# Patient Record
Sex: FEMALE
[De-identification: names, ages, dates, MRNs, and addresses within clinical notes are randomized per-mention and may not be internally consistent; named-entity substitution may affect disease eponyms.]

---

## 2019-10-02 ENCOUNTER — HOSPITAL ENCOUNTER (OUTPATIENT)
Dept: HOSPITAL 56 - MW.ED | Age: 29
Setting detail: OBSERVATION
LOS: 2 days | Discharge: HOME | End: 2019-10-04
Attending: INTERNAL MEDICINE | Admitting: INTERNAL MEDICINE
Payer: COMMERCIAL

## 2019-10-02 DIAGNOSIS — N13.30: ICD-10-CM

## 2019-10-02 DIAGNOSIS — F17.210: ICD-10-CM

## 2019-10-02 DIAGNOSIS — N12: Primary | ICD-10-CM

## 2019-10-02 LAB
BUN SERPL-MCNC: 10 MG/DL (ref 7–18)
CHLORIDE SERPL-SCNC: 97 MMOL/L (ref 98–107)
CO2 SERPL-SCNC: 24.9 MMOL/L (ref 21–32)
GLUCOSE SERPL-MCNC: 103 MG/DL (ref 74–106)
LIPASE SERPL-CCNC: 68 U/L (ref 73–393)
POTASSIUM SERPL-SCNC: 3.4 MMOL/L (ref 3.5–5.1)
SODIUM SERPL-SCNC: 135 MMOL/L (ref 136–145)

## 2019-10-02 PROCEDURE — 36415 COLL VENOUS BLD VENIPUNCTURE: CPT

## 2019-10-02 PROCEDURE — 96375 TX/PRO/DX INJ NEW DRUG ADDON: CPT

## 2019-10-02 PROCEDURE — 80048 BASIC METABOLIC PNL TOTAL CA: CPT

## 2019-10-02 PROCEDURE — 81025 URINE PREGNANCY TEST: CPT

## 2019-10-02 PROCEDURE — 83605 ASSAY OF LACTIC ACID: CPT

## 2019-10-02 PROCEDURE — 83690 ASSAY OF LIPASE: CPT

## 2019-10-02 PROCEDURE — G0378 HOSPITAL OBSERVATION PER HR: HCPCS

## 2019-10-02 PROCEDURE — 81001 URINALYSIS AUTO W/SCOPE: CPT

## 2019-10-02 PROCEDURE — 87086 URINE CULTURE/COLONY COUNT: CPT

## 2019-10-02 PROCEDURE — 85025 COMPLETE CBC W/AUTO DIFF WBC: CPT

## 2019-10-02 PROCEDURE — 87040 BLOOD CULTURE FOR BACTERIA: CPT

## 2019-10-02 PROCEDURE — 96365 THER/PROPH/DIAG IV INF INIT: CPT

## 2019-10-02 PROCEDURE — 96361 HYDRATE IV INFUSION ADD-ON: CPT

## 2019-10-02 PROCEDURE — 99285 EMERGENCY DEPT VISIT HI MDM: CPT

## 2019-10-02 PROCEDURE — 74177 CT ABD & PELVIS W/CONTRAST: CPT

## 2019-10-02 PROCEDURE — 80053 COMPREHEN METABOLIC PANEL: CPT

## 2019-10-02 NOTE — CT
INDICATION:



Left flank pain radiating to the left lower quadrant. Nausea and vomiting.



TECHNIQUE:



CT abdomen and pelvis acquired with 75 cc Isovue 370 IV contrast.



COMPARISON:



None. 



FINDINGS:



Lower chest: Unremarkable. 



Liver: Unremarkable. Normal in size and attenuation. No masses. 



Gallbladder and bile ducts: Unremarkable. No stones or inflammation. No 

biliary dilatation. 



Pancreas: Unremarkable. No mass or inflammation. 



Spleen: Unremarkable. Normal in size. No masses. 



Adrenal glands: Unremarkable. No nodules. 



Kidneys: Mild left-sided hydroureteronephrosis. No current kidney or 

ureteral stone. No sign of pyelonephritis. 



GI tract: Unremarkable. Normal in caliber. No sign of mass or inflammation. 

Normal appendix.



Vasculature: Unremarkable. Mesenteric arteries are patent.



Lymph nodes: No lymphadenopathy. 



Omentum/Peritoneum/Abdominal Wall: Unremarkable. No sign of mass or 

infiltration. No free air or significant free fluid. 



Pelvis: Unremarkable. 



Bones: Unremarkable for age. 



IMPRESSION:



Mild left-sided hydroureteronephrosis. This finding can be seen with a 

recently passed stone or urinary tract infection. There are no current 

kidney or ureteral stones. Remainder of the exam is unremarkable.



Please note that all CT scans at this facility use dose modulation, 

iterative reconstruction, and/or weight-based dosing when appropriate to 

reduce radiation dose to as low as reasonably achievable.



Dictated by Castillo Joe MD @ Oct  2 2019  5:29PM



Signed by Dr. Castillo Joe @ Oct  2 2019  5:40PM

## 2019-10-02 NOTE — PCM.HP.2
H&P History of Present Illness





- General


Date of Service: 10/02/19


Admit Problem/Dx: 


 Admission Diagnosis/Problem





Admission Diagnosis/Problem      Acute pyelonephritis











- History of Present Illness


Initial Comments - Free Text/Narative: 





30 yo female who presents with one week of dysuria and urinary frequency.  Two 

days ago she was prescribed bactrim by [facility] for UTI.  She has 

since developed flank pain, fevers, and nausea.  She was seen in the ED and 

noted to have a leukocytosis of 18,000 and CT scan showing hydrouteronephrosis.


  ** Left Lower Abdomen


Pain Score (Numeric/FACES): 8





- Related Data


Allergies/Adverse Reactions: 


 Allergies











Allergy/AdvReac Type Severity Reaction Status Date / Time


 


No Known Allergies Allergy   Verified 10/02/19 20:33











Home Medications: 


 Home Meds





Loratadine [Allergy Relief] 10 mg PO DAILY PRN 10/02/19 [History]


Sulfamethoxazole/Trimethoprim [Bactrim Ds Tablet] 800 mg PO BID 10/02/19 [

History]











Past Medical History





- Past Health History


Medical/Surgical History: Denies Medical/Surgical History


HEENT History: Reports: None


Cardiovascular History: Reports: None


Respiratory History: Reports: None


Gastrointestinal History: Reports: None


Genitourinary History: Reports: None


OB/GYN History: Reports: Polycystic Ovaries


Musculoskeletal History: Reports: None


Neurological History: Reports: None


Psychiatric History: Reports: None


Endocrine/Metabolic History: Reports: None


Hematologic History: Reports: None


Immunologic History: Reports: None


Oncologic (Cancer) History: Reports: None


Dermatologic History: Reports: None





- Infectious Disease History


Infectious Disease History: Reports: Chicken Pox





- Past Surgical History


Head Surgeries/Procedures: Reports: None





Social & Family History





- Family History


Oncologic: Reports: Other (See Below)


Other Oncologic Family History: mouth cancer (father)





- Tobacco Use


Smoking Status *Q: Current Every Day Smoker


Years of Tobacco use: 14


Packs/Tins Daily: 1





- Caffeine Use


Caffeine Use: Reports: None





- Alcohol Use


Days Per Week of Alcohol Use: 7


Number of Drinks Per Day: 2


Total Drinks Per Week: 14





- Recreational Drug Use


Recreational Drug Use: No





H&P Review of Systems





- Review of Systems:


Review Of Systems: ROS reveals no pertinent complaints other than HPI.





Exam





- Exam


Exam: See Below





- Vital Signs


Vital Signs: 


 Last Vital Signs











Temp  36.7 C   10/02/19 16:03


 


Pulse  71   10/02/19 19:33


 


Resp  18   10/02/19 19:33


 


BP  104/56 L  10/02/19 19:33


 


Pulse Ox  98   10/02/19 16:03











Weight: 72.575 kg





- Exam


General: Alert, Oriented


HEENT: Mucosa Moist & Pink


Neck: Supple


Lungs: Clear to Auscultation, Normal Respiratory Effort


Cardiovascular: Regular Rate, Regular Rhythm


GI/Abdominal Exam: Normal Bowel Sounds, Soft, Non-Tender


Back Exam: No: CVA Tenderness (L), CVA Tenderness (R)


Skin: Warm, Dry, Intact





- Patient Data


Lab Results Last 24 hrs: 


 Laboratory Results - last 24 hr











  10/02/19 10/02/19 10/02/19 Range/Units





  16:00 16:00 16:19 


 


WBC    18.35 H  (4.0-11.0)  K/uL


 


RBC    4.61  (4.30-5.90)  M/uL


 


Hgb    14.3  (12.0-16.0)  g/dL


 


Hct    41.7  (36.0-46.0)  %


 


MCV    90.5  (80.0-98.0)  fL


 


MCH    31.0  (27.0-32.0)  pg


 


MCHC    34.3  (31.0-37.0)  g/dL


 


RDW Std Deviation    44.6  (28.0-62.0)  fl


 


RDW Coeff of Nyasia    13  (11.0-15.0)  %


 


Plt Count    226  (150-400)  K/uL


 


MPV    10.70  (7.40-12.00)  fL


 


Neut % (Auto)    83.6 H  (48.0-80.0)  %


 


Lymph % (Auto)    7.7 L  (16.0-40.0)  %


 


Mono % (Auto)    8.1  (0.0-15.0)  %


 


Eos % (Auto)    0.4  (0.0-7.0)  %


 


Baso % (Auto)    0.2  (0.0-1.5)  %


 


Neut # (Auto)    15.3 H  (1.4-5.7)  K/uL


 


Lymph # (Auto)    1.4  (0.6-2.4)  K/uL


 


Mono # (Auto)    1.5 H  (0.0-0.8)  K/uL


 


Eos # (Auto)    0.1  (0.0-0.7)  K/uL


 


Baso # (Auto)    0.0  (0.0-0.1)  K/uL


 


Nucleated RBC %    0.0  /100WBC


 


Nucleated RBCs #    0  K/uL


 


Lactate     (0.20-2.00)  mmol/L


 


Sodium     (136-145)  mmol/L


 


Potassium     (3.5-5.1)  mmol/L


 


Chloride     ()  mmol/L


 


Carbon Dioxide     (21.0-32.0)  mmol/L


 


BUN     (7.0-18.0)  mg/dL


 


Creatinine     (0.6-1.0)  mg/dL


 


Est Cr Clr Drug Dosing     mL/min


 


Estimated GFR (MDRD)     ml/min


 


Glucose     ()  mg/dL


 


Calcium     (8.5-10.1)  mg/dL


 


Total Bilirubin     (0.2-1.0)  mg/dL


 


AST     (15-37)  IU/L


 


ALT     (14-63)  IU/L


 


Alkaline Phosphatase     ()  U/L


 


Total Protein     (6.4-8.2)  g/dL


 


Albumin     (3.4-5.0)  g/dL


 


Globulin     (2.6-4.0)  g/dL


 


Albumin/Globulin Ratio     (0.9-1.6)  


 


Lipase     ()  U/L


 


Urine Color  YELLOW    


 


Urine Appearance  HAZY    


 


Urine pH  5.5    (5.0-8.0)  


 


Ur Specific Gravity  1.015    (1.001-1.035)  


 


Urine Protein  TRACE H    (NEGATIVE)  mg/dL


 


Urine Glucose (UA)  NEGATIVE    (NEGATIVE)  mg/dL


 


Urine Ketones  40 H    (NEGATIVE)  mg/dL


 


Urine Occult Blood  MODERATE H    (NEGATIVE)  


 


Urine Nitrite  NEGATIVE    (NEGATIVE)  


 


Urine Bilirubin  SMALL H    (NEGATIVE)  


 


Urine Urobilinogen  0.2    (<2.0)  EU/dL


 


Ur Leukocyte Esterase  SMALL H    (NEGATIVE)  


 


Urine RBC  0-3    (0-2/HPF)  


 


Urine WBC  4-6    (0-5/HPF)  


 


Ur Epithelial Cells  FEW    (NONE-FEW)  


 


Urine Bacteria  RARE    (NEGATIVE)  


 


Urine Mucus  LIGHT    (NONE-MOD)  


 


Urine HCG, Qual   NEGATIVE   (NEGATIVE)  














  10/02/19 10/02/19 Range/Units





  16:19 17:41 


 


WBC    (4.0-11.0)  K/uL


 


RBC    (4.30-5.90)  M/uL


 


Hgb    (12.0-16.0)  g/dL


 


Hct    (36.0-46.0)  %


 


MCV    (80.0-98.0)  fL


 


MCH    (27.0-32.0)  pg


 


MCHC    (31.0-37.0)  g/dL


 


RDW Std Deviation    (28.0-62.0)  fl


 


RDW Coeff of Nyasia    (11.0-15.0)  %


 


Plt Count    (150-400)  K/uL


 


MPV    (7.40-12.00)  fL


 


Neut % (Auto)    (48.0-80.0)  %


 


Lymph % (Auto)    (16.0-40.0)  %


 


Mono % (Auto)    (0.0-15.0)  %


 


Eos % (Auto)    (0.0-7.0)  %


 


Baso % (Auto)    (0.0-1.5)  %


 


Neut # (Auto)    (1.4-5.7)  K/uL


 


Lymph # (Auto)    (0.6-2.4)  K/uL


 


Mono # (Auto)    (0.0-0.8)  K/uL


 


Eos # (Auto)    (0.0-0.7)  K/uL


 


Baso # (Auto)    (0.0-0.1)  K/uL


 


Nucleated RBC %    /100WBC


 


Nucleated RBCs #    K/uL


 


Lactate   0.9  (0.20-2.00)  mmol/L


 


Sodium  135 L   (136-145)  mmol/L


 


Potassium  3.4 L   (3.5-5.1)  mmol/L


 


Chloride  97 L   ()  mmol/L


 


Carbon Dioxide  24.9   (21.0-32.0)  mmol/L


 


BUN  10   (7.0-18.0)  mg/dL


 


Creatinine  1.3 H   (0.6-1.0)  mg/dL


 


Est Cr Clr Drug Dosing  66.73   mL/min


 


Estimated GFR (MDRD)  48.4   ml/min


 


Glucose  103   ()  mg/dL


 


Calcium  9.0   (8.5-10.1)  mg/dL


 


Total Bilirubin  1.4 H   (0.2-1.0)  mg/dL


 


AST  19   (15-37)  IU/L


 


ALT  20   (14-63)  IU/L


 


Alkaline Phosphatase  64   ()  U/L


 


Total Protein  7.6   (6.4-8.2)  g/dL


 


Albumin  3.5   (3.4-5.0)  g/dL


 


Globulin  4.1 H   (2.6-4.0)  g/dL


 


Albumin/Globulin Ratio  0.9   (0.9-1.6)  


 


Lipase  68 L   ()  U/L


 


Urine Color    


 


Urine Appearance    


 


Urine pH    (5.0-8.0)  


 


Ur Specific Gravity    (1.001-1.035)  


 


Urine Protein    (NEGATIVE)  mg/dL


 


Urine Glucose (UA)    (NEGATIVE)  mg/dL


 


Urine Ketones    (NEGATIVE)  mg/dL


 


Urine Occult Blood    (NEGATIVE)  


 


Urine Nitrite    (NEGATIVE)  


 


Urine Bilirubin    (NEGATIVE)  


 


Urine Urobilinogen    (<2.0)  EU/dL


 


Ur Leukocyte Esterase    (NEGATIVE)  


 


Urine RBC    (0-2/HPF)  


 


Urine WBC    (0-5/HPF)  


 


Ur Epithelial Cells    (NONE-FEW)  


 


Urine Bacteria    (NEGATIVE)  


 


Urine Mucus    (NONE-MOD)  


 


Urine HCG, Qual    (NEGATIVE)  











Result Diagrams: 


 10/03/19 04:45





 10/03/19 04:45


Problem List Initiated/Reviewed/Updated: Yes


Orders Last 24hrs: 


 Active Orders 24 hr











 Category Date Time Status


 


 Admission Status [Patient Status] [ADT] Stat ADT  10/02/19 18:18 Active


 


 Antiembolic Devices [RC] PER UNIT ROUTINE Care  10/02/19 21:14 Ordered


 


 Oxygen Therapy [RC] PRN Care  10/02/19 21:13 Ordered


 


 Up ad Bhavna [RC] ASDIRECTED Care  10/02/19 21:13 Ordered


 


 VTE/DVT Education [RC] PER UNIT ROUTINE Care  10/02/19 21:13 Ordered


 


 Vital Signs [RC] Q4H Care  10/02/19 21:13 Ordered


 


 Regular Diet [DIET] Diet  10/02/19 Breakfast Ordered


 


 BASIC METABOLIC PANEL,BMP [CHEM] AM Lab  10/03/19 05:11 Ordered


 


 CBC WITH AUTO DIFF [HEME] AM Lab  10/03/19 05:11 Ordered


 


 CULTURE BLOOD [BC] Stat Lab  10/02/19 17:41 Received


 


 CULTURE BLOOD [BC] Stat Lab  10/02/19 17:51 Received


 


 CULTURE URINE [RM] Stat Lab  10/02/19 16:00 Received


 


 Acetaminophen [Tylenol] Med  10/02/19 21:13 Ordered





 650 mg PO Q4H PRN   


 


 Ibuprofen [Motrin] Med  10/02/19 21:13 Ordered





 200 mg PO Q6H PRN   


 


 Ondansetron [Zofran] Med  10/02/19 21:13 Ordered





 4 mg IVPUSH Q4H PRN   


 


 Sodium Chloride 0.9% @ 125 MLS/HR (1000ml) Med  10/02/19 21:15 Ordered





 Sodium Chloride 0.9% [Normal Saline] 1,000 ml   





 IV ASDIRECTED   


 


 cefTRIAXone [Rocephin] 1 gm Med  10/03/19 17:00 Ordered





 Sodium Chloride 0.9% [Normal Saline] 50 ml   





 IV Q24H   


 


 Blood Culture x2 Reflex Set [OM.PC] Stat Oth  10/02/19 16:57 Ordered


 


 Sequential Compression Device [OM.PC] Per Unit Routine Oth  10/02/19 21:13 

Ordered


 


 Resuscitation Status Routine Resus Stat  10/02/19 21:13 Ordered








 Medication Orders





Ceftriaxone Sodium 1 gm/ (Sodium Chloride)  50 mls @ 100 mls/hr IV Q24H KATELYNN








Assessment/Plan Comment:: 





30 yo female admitted for pyelonephritis that has failed outpatient management.

  We will treat with Rocephin and await cultures.

## 2019-10-02 NOTE — EDM.PDOC
ED HPI GENERAL MEDICAL PROBLEM





- General


Chief Complaint: Genitourinary Problem


Stated Complaint: KIDNEY STONES


Time Seen by Provider: 10/02/19 16:02


Source of Information: Reports: Patient


History Limitations: Reports: No Limitations





- History of Present Illness


INITIAL COMMENTS - FREE TEXT/NARRATIVE: 


HISTORY AND PHYSICAL:





History of present illness:


Patient is a 29-year-old female presents to the ED today with concern of left 

flank pain 3 days. Patient states she main appointment yesterday with [facility] and was given Bactrim for a urinary infection. Patient 

states since then the left flank pain has worsened and she is concerned she may 

have a kidney stone. Patient denies any health history or any other symptoms or 

concerns at this time.





Patient denies fever, chills, chest pain, shortness of breath, or cough. Denies 

headache, neck stiff ness, change in vision, syncope, or near syncope. Denies 

nausea, vomiting, diarrhea, constipation, or dysuria. Has not noted any blood 

in urine or stool. Patient has been eating and drinking appropriately.





Review of systems: 


As per history of present illness and below otherwise all systems reviewed and 

negative.





Past medical history: 


As per history of present illness and as reviewed below otherwise 

noncontributory.





Surgical history: 


As per history of present illness and as reviewed below otherwise 

noncontributory.





Social history: 


See social history for further information





Family history: 


As per history of present illness and as reviewed below otherwise 

noncontributory.





Physical exam:


General: Patient is alert, oriented, and in no acute distress. Patient sitting 

comfortably on exam table.


HEENT: Atraumatic, normocephalic, pupils equal and reactive bilaterally, 

negative for conjunctival pallor or scleral icterus, mucous membranes moist, 

TMs normal bilaterally, throat clear, neck supple, nontender, trachea midline. 

No drooling or trismus noted. No meningeal signs. No hot potato voice noted. 


Lungs: Clear to auscultation, breath sounds equal bilaterally, chest nontender.


Heart: S1S2, regular rate and rhythm without overt murmur


Abdomen: Soft, nondistended, nontender. Negative for masses or 

hepatosplenomegaly. Positive for costovertebral tenderness of the left.


Pelvis: Stable nontender.


Genitourinary: Deferred.


Rectal: Deferred.


Skin: Intact, warm, dry. No lesions or rashes noted.


Extremities: Atraumatic, negative for cords or calf pain. Neurovascular 

unremarkable.


Neuro: Awake, alert, oriented. Cranial nerves II through XII unremarkable. 

Cerebellum unremarkable. Motor and sensory unremarkable throughout. Exam 

nonfocal.





Notes:


Dr. Banda was consulted on patient and will admit to observation.


Voices understanding and is agreeable to plan of care. Denies any further 

questions or concerns at this time.





Diagnostics:


CBC, CMP, lipase, UA, urine hCG, abdominal pelvic CT, lactate, blood cultures 2





Therapeutics:


Rocephin, saline, Toradol, Zofran





Impression: 


Acute pyelonephritis





Plan:


1. Admit to observation to Dr. Banda.





Definitive disposition and diagnosis as appropriate pending reevaluation and 

review of above.





  ** Left Lower Abdomen


Pain Score (Numeric/FACES): 8





- Related Data


 Allergies











Allergy/AdvReac Type Severity Reaction Status Date / Time


 


No Known Allergies Allergy   Verified 10/02/19 16:04











Home Meds: 


 Home Meds





Sulfamethoxazole/Trimethoprim [Bactrim Ds Tablet] 800 mg PO BID 10/02/19 [

History]











Past Medical History





- Past Health History


Medical/Surgical History: Denies Medical/Surgical History


HEENT History: Reports: None


Cardiovascular History: Reports: None


Respiratory History: Reports: None


Gastrointestinal History: Reports: None


Genitourinary History: Reports: None


OB/GYN History: Reports: Polycystic Ovaries


Musculoskeletal History: Reports: None


Neurological History: Reports: None


Psychiatric History: Reports: None


Endocrine/Metabolic History: Reports: None


Hematologic History: Reports: None


Immunologic History: Reports: None


Oncologic (Cancer) History: Reports: None


Dermatologic History: Reports: None





- Infectious Disease History


Infectious Disease History: Reports: None





- Past Surgical History


Head Surgeries/Procedures: Reports: None





Social & Family History





- Tobacco Use


Smoking Status *Q: Current Every Day Smoker


Years of Tobacco use: 14


Packs/Tins Daily: 1





- Caffeine Use


Caffeine Use: Reports: None





- Alcohol Use


Days Per Week of Alcohol Use: 7


Number of Drinks Per Day: 2


Total Drinks Per Week: 14





- Recreational Drug Use


Recreational Drug Use: No





ED ROS GENERAL





- Review of Systems


Review Of Systems: ROS reveals no pertinent complaints other than HPI.





ED EXAM, GENERAL





- Physical Exam


Exam: See Below (see dictation)





Course





- Vital Signs


Last Recorded V/S: 


 Last Vital Signs











Temp  98.1 F   10/02/19 16:03


 


Pulse  114 H  10/02/19 16:03


 


Resp  18   10/02/19 16:03


 


BP  131/69   10/02/19 16:03


 


Pulse Ox  98   10/02/19 16:03














- Orders/Labs/Meds


Orders: 


 Active Orders 24 hr











 Category Date Time Status


 


 Admission Status [Patient Status] [ADT] Stat ADT  10/02/19 18:18 Ordered


 


 CULTURE BLOOD [BC] Stat Lab  10/02/19 17:41 Received


 


 CULTURE BLOOD [BC] Stat Lab  10/02/19 17:51 Received


 


 CULTURE URINE [RM] Stat Lab  10/02/19 16:00 Received


 


 Blood Culture x2 Reflex Set [OM.PC] Stat Oth  10/02/19 16:57 Ordered











Labs: 


 Laboratory Tests











  10/02/19 10/02/19 10/02/19 Range/Units





  16:00 16:00 16:19 


 


WBC    18.35 H  (4.0-11.0)  K/uL


 


RBC    4.61  (4.30-5.90)  M/uL


 


Hgb    14.3  (12.0-16.0)  g/dL


 


Hct    41.7  (36.0-46.0)  %


 


MCV    90.5  (80.0-98.0)  fL


 


MCH    31.0  (27.0-32.0)  pg


 


MCHC    34.3  (31.0-37.0)  g/dL


 


RDW Std Deviation    44.6  (28.0-62.0)  fl


 


RDW Coeff of Nyasia    13  (11.0-15.0)  %


 


Plt Count    226  (150-400)  K/uL


 


MPV    10.70  (7.40-12.00)  fL


 


Neut % (Auto)    83.6 H  (48.0-80.0)  %


 


Lymph % (Auto)    7.7 L  (16.0-40.0)  %


 


Mono % (Auto)    8.1  (0.0-15.0)  %


 


Eos % (Auto)    0.4  (0.0-7.0)  %


 


Baso % (Auto)    0.2  (0.0-1.5)  %


 


Neut # (Auto)    15.3 H  (1.4-5.7)  K/uL


 


Lymph # (Auto)    1.4  (0.6-2.4)  K/uL


 


Mono # (Auto)    1.5 H  (0.0-0.8)  K/uL


 


Eos # (Auto)    0.1  (0.0-0.7)  K/uL


 


Baso # (Auto)    0.0  (0.0-0.1)  K/uL


 


Nucleated RBC %    0.0  /100WBC


 


Nucleated RBCs #    0  K/uL


 


Lactate     (0.20-2.00)  mmol/L


 


Sodium     (136-145)  mmol/L


 


Potassium     (3.5-5.1)  mmol/L


 


Chloride     ()  mmol/L


 


Carbon Dioxide     (21.0-32.0)  mmol/L


 


BUN     (7.0-18.0)  mg/dL


 


Creatinine     (0.6-1.0)  mg/dL


 


Est Cr Clr Drug Dosing     mL/min


 


Estimated GFR (MDRD)     ml/min


 


Glucose     ()  mg/dL


 


Calcium     (8.5-10.1)  mg/dL


 


Total Bilirubin     (0.2-1.0)  mg/dL


 


AST     (15-37)  IU/L


 


ALT     (14-63)  IU/L


 


Alkaline Phosphatase     ()  U/L


 


Total Protein     (6.4-8.2)  g/dL


 


Albumin     (3.4-5.0)  g/dL


 


Globulin     (2.6-4.0)  g/dL


 


Albumin/Globulin Ratio     (0.9-1.6)  


 


Lipase     ()  U/L


 


Urine Color  YELLOW    


 


Urine Appearance  HAZY    


 


Urine pH  5.5    (5.0-8.0)  


 


Ur Specific Gravity  1.015    (1.001-1.035)  


 


Urine Protein  TRACE H    (NEGATIVE)  mg/dL


 


Urine Glucose (UA)  NEGATIVE    (NEGATIVE)  mg/dL


 


Urine Ketones  40 H    (NEGATIVE)  mg/dL


 


Urine Occult Blood  MODERATE H    (NEGATIVE)  


 


Urine Nitrite  NEGATIVE    (NEGATIVE)  


 


Urine Bilirubin  SMALL H    (NEGATIVE)  


 


Urine Urobilinogen  0.2    (<2.0)  EU/dL


 


Ur Leukocyte Esterase  SMALL H    (NEGATIVE)  


 


Urine RBC  0-3    (0-2/HPF)  


 


Urine WBC  4-6    (0-5/HPF)  


 


Ur Epithelial Cells  FEW    (NONE-FEW)  


 


Urine Bacteria  RARE    (NEGATIVE)  


 


Urine Mucus  LIGHT    (NONE-MOD)  


 


Urine HCG, Qual   NEGATIVE   (NEGATIVE)  














  10/02/19 10/02/19 Range/Units





  16:19 17:41 


 


WBC    (4.0-11.0)  K/uL


 


RBC    (4.30-5.90)  M/uL


 


Hgb    (12.0-16.0)  g/dL


 


Hct    (36.0-46.0)  %


 


MCV    (80.0-98.0)  fL


 


MCH    (27.0-32.0)  pg


 


MCHC    (31.0-37.0)  g/dL


 


RDW Std Deviation    (28.0-62.0)  fl


 


RDW Coeff of Nyasia    (11.0-15.0)  %


 


Plt Count    (150-400)  K/uL


 


MPV    (7.40-12.00)  fL


 


Neut % (Auto)    (48.0-80.0)  %


 


Lymph % (Auto)    (16.0-40.0)  %


 


Mono % (Auto)    (0.0-15.0)  %


 


Eos % (Auto)    (0.0-7.0)  %


 


Baso % (Auto)    (0.0-1.5)  %


 


Neut # (Auto)    (1.4-5.7)  K/uL


 


Lymph # (Auto)    (0.6-2.4)  K/uL


 


Mono # (Auto)    (0.0-0.8)  K/uL


 


Eos # (Auto)    (0.0-0.7)  K/uL


 


Baso # (Auto)    (0.0-0.1)  K/uL


 


Nucleated RBC %    /100WBC


 


Nucleated RBCs #    K/uL


 


Lactate   0.9  (0.20-2.00)  mmol/L


 


Sodium  135 L   (136-145)  mmol/L


 


Potassium  3.4 L   (3.5-5.1)  mmol/L


 


Chloride  97 L   ()  mmol/L


 


Carbon Dioxide  24.9   (21.0-32.0)  mmol/L


 


BUN  10   (7.0-18.0)  mg/dL


 


Creatinine  1.3 H   (0.6-1.0)  mg/dL


 


Est Cr Clr Drug Dosing  66.73   mL/min


 


Estimated GFR (MDRD)  48.4   ml/min


 


Glucose  103   ()  mg/dL


 


Calcium  9.0   (8.5-10.1)  mg/dL


 


Total Bilirubin  1.4 H   (0.2-1.0)  mg/dL


 


AST  19   (15-37)  IU/L


 


ALT  20   (14-63)  IU/L


 


Alkaline Phosphatase  64   ()  U/L


 


Total Protein  7.6   (6.4-8.2)  g/dL


 


Albumin  3.5   (3.4-5.0)  g/dL


 


Globulin  4.1 H   (2.6-4.0)  g/dL


 


Albumin/Globulin Ratio  0.9   (0.9-1.6)  


 


Lipase  68 L   ()  U/L


 


Urine Color    


 


Urine Appearance    


 


Urine pH    (5.0-8.0)  


 


Ur Specific Gravity    (1.001-1.035)  


 


Urine Protein    (NEGATIVE)  mg/dL


 


Urine Glucose (UA)    (NEGATIVE)  mg/dL


 


Urine Ketones    (NEGATIVE)  mg/dL


 


Urine Occult Blood    (NEGATIVE)  


 


Urine Nitrite    (NEGATIVE)  


 


Urine Bilirubin    (NEGATIVE)  


 


Urine Urobilinogen    (<2.0)  EU/dL


 


Ur Leukocyte Esterase    (NEGATIVE)  


 


Urine RBC    (0-2/HPF)  


 


Urine WBC    (0-5/HPF)  


 


Ur Epithelial Cells    (NONE-FEW)  


 


Urine Bacteria    (NEGATIVE)  


 


Urine Mucus    (NONE-MOD)  


 


Urine HCG, Qual    (NEGATIVE)  











Meds: 


Medications














Discontinued Medications














Generic Name Dose Route Start Last Admin





  Trade Name Chastity  PRN Reason Stop Dose Admin


 


Sodium Chloride  1,000 mls @ 999 mls/hr  10/02/19 16:03  10/02/19 16:28





  Normal Saline  IV  10/02/19 17:03  999 mls/hr





  BOLUS ONE   Administration





     





     





     





     


 


Ceftriaxone Sodium/Dextrose 1  50 mls @ 100 mls/hr  10/02/19 16:56  10/02/19 17:

56





  gm/ Premix  IV  10/02/19 17:25  100 mls/hr





  ONETIME ONE   Administration





     





     





     





     


 


Iopamidol  75 ml  10/02/19 17:23  10/02/19 17:23





  Isovue Multipack-370 (76%)  IVPUSH  10/02/19 17:24  75 ml





  ONETIME STA   Administration





     





     





     





     


 


Ketorolac Tromethamine  30 mg  10/02/19 16:03  10/02/19 16:28





  Toradol  IVPUSH  10/02/19 16:04  30 mg





  ONETIME ONE   Administration





     





     





     





     


 


Ondansetron HCl  4 mg  10/02/19 16:03  10/02/19 16:28





  Zofran  IVPUSH  10/02/19 16:04  4 mg





  ONETIME ONE   Administration





     





     





     





     














Departure





- Departure


Time of Disposition: 18:16


Disposition: Refer to Observation


Clinical Impression: 


 Acute pyelonephritis








- Discharge Information


Referrals: 


PCP,None [Primary Care Provider] - 


Forms:  ED Department Discharge





- My Orders


Last 24 Hours: 


My Active Orders





10/02/19 16:00


CULTURE URINE [RM] Stat 





10/02/19 16:57


Blood Culture x2 Reflex Set [OM.PC] Stat 





10/02/19 17:41


CULTURE BLOOD [BC] Stat 





10/02/19 17:51


CULTURE BLOOD [BC] Stat 





10/02/19 18:18


Admission Status [Patient Status] [ADT] Stat 














- Assessment/Plan


Last 24 Hours: 


My Active Orders





10/02/19 16:00


CULTURE URINE [RM] Stat 





10/02/19 16:57


Blood Culture x2 Reflex Set [OM.PC] Stat 





10/02/19 17:41


CULTURE BLOOD [BC] Stat 





10/02/19 17:51


CULTURE BLOOD [BC] Stat 





10/02/19 18:18


Admission Status [Patient Status] [ADT] Stat

## 2019-10-03 LAB
BUN SERPL-MCNC: 10 MG/DL (ref 7–18)
CHLORIDE SERPL-SCNC: 103 MMOL/L (ref 98–107)
CO2 SERPL-SCNC: 25 MMOL/L (ref 21–32)
GLUCOSE SERPL-MCNC: 91 MG/DL (ref 74–106)
POTASSIUM SERPL-SCNC: 3.4 MMOL/L (ref 3.5–5.1)
SODIUM SERPL-SCNC: 138 MMOL/L (ref 136–145)

## 2019-10-03 NOTE — PCM.PN
- General Info


Date of Service: 10/03/19





- Review of Systems


Systems Review Comment:: 


pain is improving, had fever this morning, reports some nausea.





- Patient Data


Vitals - Most Recent: 


 Last Vital Signs











Temp  36.6 C   10/03/19 08:00


 


Pulse  72   10/03/19 08:00


 


Resp  16   10/03/19 08:00


 


BP  112/66   10/03/19 08:00


 


Pulse Ox  97   10/03/19 08:00











Weight - Most Recent: 72.575 kg


I&O - Last 24 Hours: 


 Intake & Output











 10/02/19 10/03/19 10/03/19





 22:59 06:59 14:59


 


Intake Total  1574 


 


Output Total  700 


 


Balance  874 











Lab Results Last 24 Hours: 


 Laboratory Results - last 24 hr











  10/02/19 10/02/19 10/02/19 Range/Units





  16:00 16:00 16:19 


 


WBC    18.35 H  (4.0-11.0)  K/uL


 


RBC    4.61  (4.30-5.90)  M/uL


 


Hgb    14.3  (12.0-16.0)  g/dL


 


Hct    41.7  (36.0-46.0)  %


 


MCV    90.5  (80.0-98.0)  fL


 


MCH    31.0  (27.0-32.0)  pg


 


MCHC    34.3  (31.0-37.0)  g/dL


 


RDW Std Deviation    44.6  (28.0-62.0)  fl


 


RDW Coeff of Nyasia    13  (11.0-15.0)  %


 


Plt Count    226  (150-400)  K/uL


 


MPV    10.70  (7.40-12.00)  fL


 


Neut % (Auto)    83.6 H  (48.0-80.0)  %


 


Lymph % (Auto)    7.7 L  (16.0-40.0)  %


 


Mono % (Auto)    8.1  (0.0-15.0)  %


 


Eos % (Auto)    0.4  (0.0-7.0)  %


 


Baso % (Auto)    0.2  (0.0-1.5)  %


 


Neut # (Auto)    15.3 H  (1.4-5.7)  K/uL


 


Lymph # (Auto)    1.4  (0.6-2.4)  K/uL


 


Mono # (Auto)    1.5 H  (0.0-0.8)  K/uL


 


Eos # (Auto)    0.1  (0.0-0.7)  K/uL


 


Baso # (Auto)    0.0  (0.0-0.1)  K/uL


 


Nucleated RBC %    0.0  /100WBC


 


Nucleated RBCs #    0  K/uL


 


Lactate     (0.20-2.00)  mmol/L


 


Sodium     (136-145)  mmol/L


 


Potassium     (3.5-5.1)  mmol/L


 


Chloride     ()  mmol/L


 


Carbon Dioxide     (21.0-32.0)  mmol/L


 


BUN     (7.0-18.0)  mg/dL


 


Creatinine     (0.6-1.0)  mg/dL


 


Est Cr Clr Drug Dosing     mL/min


 


Estimated GFR (MDRD)     ml/min


 


Glucose     ()  mg/dL


 


Calcium     (8.5-10.1)  mg/dL


 


Total Bilirubin     (0.2-1.0)  mg/dL


 


AST     (15-37)  IU/L


 


ALT     (14-63)  IU/L


 


Alkaline Phosphatase     ()  U/L


 


Total Protein     (6.4-8.2)  g/dL


 


Albumin     (3.4-5.0)  g/dL


 


Globulin     (2.6-4.0)  g/dL


 


Albumin/Globulin Ratio     (0.9-1.6)  


 


Lipase     ()  U/L


 


Urine Color  YELLOW    


 


Urine Appearance  HAZY    


 


Urine pH  5.5    (5.0-8.0)  


 


Ur Specific Gravity  1.015    (1.001-1.035)  


 


Urine Protein  TRACE H    (NEGATIVE)  mg/dL


 


Urine Glucose (UA)  NEGATIVE    (NEGATIVE)  mg/dL


 


Urine Ketones  40 H    (NEGATIVE)  mg/dL


 


Urine Occult Blood  MODERATE H    (NEGATIVE)  


 


Urine Nitrite  NEGATIVE    (NEGATIVE)  


 


Urine Bilirubin  SMALL H    (NEGATIVE)  


 


Urine Urobilinogen  0.2    (<2.0)  EU/dL


 


Ur Leukocyte Esterase  SMALL H    (NEGATIVE)  


 


Urine RBC  0-3    (0-2/HPF)  


 


Urine WBC  4-6    (0-5/HPF)  


 


Ur Epithelial Cells  FEW    (NONE-FEW)  


 


Urine Bacteria  RARE    (NEGATIVE)  


 


Urine Mucus  LIGHT    (NONE-MOD)  


 


Urine HCG, Qual   NEGATIVE   (NEGATIVE)  














  10/02/19 10/02/19 10/03/19 Range/Units





  16:19 17:41 04:45 


 


WBC    10.48  (4.0-11.0)  K/uL


 


RBC    4.10 L  (4.30-5.90)  M/uL


 


Hgb    12.7  (12.0-16.0)  g/dL


 


Hct    37.2  (36.0-46.0)  %


 


MCV    90.7  (80.0-98.0)  fL


 


MCH    31.0  (27.0-32.0)  pg


 


MCHC    34.1  (31.0-37.0)  g/dL


 


RDW Std Deviation    44.2  (28.0-62.0)  fl


 


RDW Coeff of Nyasia    13  (11.0-15.0)  %


 


Plt Count    214  (150-400)  K/uL


 


MPV    11.00  (7.40-12.00)  fL


 


Neut % (Auto)    72.8  (48.0-80.0)  %


 


Lymph % (Auto)    14.2 L  (16.0-40.0)  %


 


Mono % (Auto)    12.0  (0.0-15.0)  %


 


Eos % (Auto)    0.8  (0.0-7.0)  %


 


Baso % (Auto)    0.2  (0.0-1.5)  %


 


Neut # (Auto)    7.6 H  (1.4-5.7)  K/uL


 


Lymph # (Auto)    1.5  (0.6-2.4)  K/uL


 


Mono # (Auto)    1.3 H  (0.0-0.8)  K/uL


 


Eos # (Auto)    0.1  (0.0-0.7)  K/uL


 


Baso # (Auto)    0.0  (0.0-0.1)  K/uL


 


Nucleated RBC %    0.0  /100WBC


 


Nucleated RBCs #    0  K/uL


 


Lactate   0.9   (0.20-2.00)  mmol/L


 


Sodium  135 L    (136-145)  mmol/L


 


Potassium  3.4 L    (3.5-5.1)  mmol/L


 


Chloride  97 L    ()  mmol/L


 


Carbon Dioxide  24.9    (21.0-32.0)  mmol/L


 


BUN  10    (7.0-18.0)  mg/dL


 


Creatinine  1.3 H    (0.6-1.0)  mg/dL


 


Est Cr Clr Drug Dosing  66.73    mL/min


 


Estimated GFR (MDRD)  48.4    ml/min


 


Glucose  103    ()  mg/dL


 


Calcium  9.0    (8.5-10.1)  mg/dL


 


Total Bilirubin  1.4 H    (0.2-1.0)  mg/dL


 


AST  19    (15-37)  IU/L


 


ALT  20    (14-63)  IU/L


 


Alkaline Phosphatase  64    ()  U/L


 


Total Protein  7.6    (6.4-8.2)  g/dL


 


Albumin  3.5    (3.4-5.0)  g/dL


 


Globulin  4.1 H    (2.6-4.0)  g/dL


 


Albumin/Globulin Ratio  0.9    (0.9-1.6)  


 


Lipase  68 L    ()  U/L


 


Urine Color     


 


Urine Appearance     


 


Urine pH     (5.0-8.0)  


 


Ur Specific Gravity     (1.001-1.035)  


 


Urine Protein     (NEGATIVE)  mg/dL


 


Urine Glucose (UA)     (NEGATIVE)  mg/dL


 


Urine Ketones     (NEGATIVE)  mg/dL


 


Urine Occult Blood     (NEGATIVE)  


 


Urine Nitrite     (NEGATIVE)  


 


Urine Bilirubin     (NEGATIVE)  


 


Urine Urobilinogen     (<2.0)  EU/dL


 


Ur Leukocyte Esterase     (NEGATIVE)  


 


Urine RBC     (0-2/HPF)  


 


Urine WBC     (0-5/HPF)  


 


Ur Epithelial Cells     (NONE-FEW)  


 


Urine Bacteria     (NEGATIVE)  


 


Urine Mucus     (NONE-MOD)  


 


Urine HCG, Qual     (NEGATIVE)  














  10/03/19 Range/Units





  04:45 


 


WBC   (4.0-11.0)  K/uL


 


RBC   (4.30-5.90)  M/uL


 


Hgb   (12.0-16.0)  g/dL


 


Hct   (36.0-46.0)  %


 


MCV   (80.0-98.0)  fL


 


MCH   (27.0-32.0)  pg


 


MCHC   (31.0-37.0)  g/dL


 


RDW Std Deviation   (28.0-62.0)  fl


 


RDW Coeff of Nyasia   (11.0-15.0)  %


 


Plt Count   (150-400)  K/uL


 


MPV   (7.40-12.00)  fL


 


Neut % (Auto)   (48.0-80.0)  %


 


Lymph % (Auto)   (16.0-40.0)  %


 


Mono % (Auto)   (0.0-15.0)  %


 


Eos % (Auto)   (0.0-7.0)  %


 


Baso % (Auto)   (0.0-1.5)  %


 


Neut # (Auto)   (1.4-5.7)  K/uL


 


Lymph # (Auto)   (0.6-2.4)  K/uL


 


Mono # (Auto)   (0.0-0.8)  K/uL


 


Eos # (Auto)   (0.0-0.7)  K/uL


 


Baso # (Auto)   (0.0-0.1)  K/uL


 


Nucleated RBC %   /100WBC


 


Nucleated RBCs #   K/uL


 


Lactate   (0.20-2.00)  mmol/L


 


Sodium  138  (136-145)  mmol/L


 


Potassium  3.4 L  (3.5-5.1)  mmol/L


 


Chloride  103  ()  mmol/L


 


Carbon Dioxide  25.0  (21.0-32.0)  mmol/L


 


BUN  10  (7.0-18.0)  mg/dL


 


Creatinine  1.3 H  (0.6-1.0)  mg/dL


 


Est Cr Clr Drug Dosing  66.73  mL/min


 


Estimated GFR (MDRD)  48.4  ml/min


 


Glucose  91  ()  mg/dL


 


Calcium  8.3 L  (8.5-10.1)  mg/dL


 


Total Bilirubin   (0.2-1.0)  mg/dL


 


AST   (15-37)  IU/L


 


ALT   (14-63)  IU/L


 


Alkaline Phosphatase   ()  U/L


 


Total Protein   (6.4-8.2)  g/dL


 


Albumin   (3.4-5.0)  g/dL


 


Globulin   (2.6-4.0)  g/dL


 


Albumin/Globulin Ratio   (0.9-1.6)  


 


Lipase   ()  U/L


 


Urine Color   


 


Urine Appearance   


 


Urine pH   (5.0-8.0)  


 


Ur Specific Gravity   (1.001-1.035)  


 


Urine Protein   (NEGATIVE)  mg/dL


 


Urine Glucose (UA)   (NEGATIVE)  mg/dL


 


Urine Ketones   (NEGATIVE)  mg/dL


 


Urine Occult Blood   (NEGATIVE)  


 


Urine Nitrite   (NEGATIVE)  


 


Urine Bilirubin   (NEGATIVE)  


 


Urine Urobilinogen   (<2.0)  EU/dL


 


Ur Leukocyte Esterase   (NEGATIVE)  


 


Urine RBC   (0-2/HPF)  


 


Urine WBC   (0-5/HPF)  


 


Ur Epithelial Cells   (NONE-FEW)  


 


Urine Bacteria   (NEGATIVE)  


 


Urine Mucus   (NONE-MOD)  


 


Urine HCG, Qual   (NEGATIVE)  











Med Orders - Current: 


 Current Medications





Acetaminophen (Tylenol)  650 mg PO Q4H PRN


   PRN Reason: Pain (Mild 1-3)/fever


   Last Admin: 10/03/19 03:09 Dose:  650 mg


Ceftriaxone Sodium 1 gm/ (Sodium Chloride)  50 mls @ 100 mls/hr IV Q24H KATELYNN


Sodium Chloride (Normal Saline)  1,000 mls @ 125 mls/hr IV ASDIRECTED KATELYNN


   Last Admin: 10/03/19 05:16 Dose:  125 mls/hr


Ibuprofen (Motrin)  200 mg PO Q6H PRN


   PRN Reason: Pain (mild 1-3)


   Last Admin: 10/02/19 21:53 Dose:  200 mg


Ondansetron HCl (Zofran)  4 mg IVPUSH Q4H PRN


   PRN Reason: Nausea





Discontinued Medications





Sodium Chloride (Normal Saline)  1,000 mls @ 999 mls/hr IV BOLUS ONE


   Stop: 10/02/19 17:03


   Last Admin: 10/02/19 16:28 Dose:  999 mls/hr


Ceftriaxone Sodium/Dextrose 1 (gm/ Premix)  50 mls @ 100 mls/hr IV ONETIME ONE


   Stop: 10/02/19 17:25


   Last Admin: 10/02/19 17:56 Dose:  100 mls/hr


Iopamidol (Isovue Multipack-370 (76%))  75 ml IVPUSH ONETIME STA


   Stop: 10/02/19 17:24


   Last Admin: 10/02/19 17:23 Dose:  75 ml


Ketorolac Tromethamine (Toradol)  30 mg IVPUSH ONETIME ONE


   Stop: 10/02/19 16:04


   Last Admin: 10/02/19 16:28 Dose:  30 mg


Nicotine (Habitrol)  14 mg TRDERM ONETIME ONE


   Stop: 10/02/19 19:00


   Last Admin: 10/02/19 19:05 Dose:  14 mg


Ondansetron HCl (Zofran)  4 mg IVPUSH ONETIME ONE


   Stop: 10/02/19 16:04


   Last Admin: 10/02/19 16:28 Dose:  4 mg











- Exam


General: Alert, Oriented


Neck: Supple


Lungs: Clear to Auscultation, Normal Respiratory Effort


Cardiovascular: Regular Rate, Regular Rhythm


GI/Abdominal Exam: Soft, Non-Tender


Back Exam: CVA Tenderness (R)


Skin: Warm, Dry, Intact


Neurological: No New Focal Deficit





- Problem List Review


Problem List Initiated/Reviewed/Updated: Yes





- My Orders


Last 24 Hours: 


My Active Orders





10/02/19 21:13


Oxygen Therapy [RC] PRN 


Up ad Bhavna [RC] ASDIRECTED 


VTE/DVT Education [RC] PER UNIT ROUTINE 


Vital Signs [RC] Q4H 


Acetaminophen [Tylenol]   650 mg PO Q4H PRN 


Ibuprofen [Motrin]   200 mg PO Q6H PRN 


Ondansetron [Zofran]   4 mg IVPUSH Q4H PRN 


Sequential Compression Device [OM.PC] Per Unit Routine 


Resuscitation Status Routine 





10/02/19 21:14


Antiembolic Devices [RC] PER UNIT ROUTINE 





10/02/19 21:15


Sodium Chloride 0.9% [Normal Saline] 1,000 ml IV ASDIRECTED 





10/03/19 17:00


cefTRIAXone [Rocephin] 1 gm   Sodium Chloride 0.9% [Normal Saline] 50 ml IV 

Q24H 





10/04/19 05:11


BASIC METABOLIC PANEL,BMP [CHEM] AM 


CBC WITH AUTO DIFF [HEME] AM 





10/05/19 05:11


BASIC METABOLIC PANEL,BMP [CHEM] AM 


CBC WITH AUTO DIFF [HEME] AM 














- Plan


Plan:: 





28 yo female admitted for E.coli pyelonephritis.  Leukocytosis is improving but 

she is still having fevers so will watch another day. Cultures from 10/1/19 are 

growing out E. coli sensitive to Rocephin

## 2019-10-04 LAB
BUN SERPL-MCNC: 8 MG/DL (ref 7–18)
CHLORIDE SERPL-SCNC: 106 MMOL/L (ref 98–107)
CO2 SERPL-SCNC: 25 MMOL/L (ref 21–32)
GLUCOSE SERPL-MCNC: 103 MG/DL (ref 74–106)
POTASSIUM SERPL-SCNC: 4 MMOL/L (ref 3.5–5.1)
SODIUM SERPL-SCNC: 141 MMOL/L (ref 136–145)

## 2019-10-04 NOTE — [id number].[id number]
**Discharge Summary





- Discharge Data


Discharge Date: 10/04/19


Discharge Disposition: Home, Self-Care 01


Condition: Good





- Referral to Home Health


Primary Care Physician: 


PCP None








- Patient Summary/Data


Hospital Course: 


28 yo female who was admitted for E.coli pyelonephritis.  She presented with 

dysuria, urinary frequency, fever and flank pain.  She was noted to have a WBC 

of 18,000 and CT scan showing mild left sided hydrouteronephrosis.  She had 

been treated with  Bactrim two days before admission.  She was treated with 

Rocephin and after two days had resolution of her fevers and leukocytosis.  

Urine cultures that were obtain prior to admission grew out E.coli.  She was 

discharged home with Levaquin for nine more days and to have follow up with 

[facility].








- Patient Instructions


Diet: Regular Diet as Tolerated


Activity: As Tolerated





- Discharge Plan


Prescriptions/Med Rec: 


Levofloxacin [Levaquin] 750 mg PO DAILY #9 tablet


Home Medications: 


 Home Meds





Loratadine [Allergy Relief] 10 mg PO DAILY PRN 10/02/19 [History]


Levofloxacin [Levaquin] 750 mg PO DAILY #9 tablet 10/04/19 [Rx]








Patient Handouts:  Pyelonephritis, Adult, Easy-to-Read


Referrals: 


[facility] [Outside]


Vielka Santa NP [Nurse Practitioner] - 10/10/19 8:00 am





- Discharge Summary/Plan Comment


DC Time >30 min.: No





- Patient Data


Vitals - Most Recent: 


 Last Vital Signs











Temp  35.9 C   10/04/19 07:46


 


Pulse  90   10/04/19 07:46


 


Resp  18   10/04/19 07:46


 


BP  130/79   10/04/19 07:46


 


Pulse Ox  98   10/04/19 07:46











Weight - Most Recent: 72.575 kg


I&O - Last 24 hours: 


 Intake & Output











 10/03/19 10/04/19 10/04/19





 22:59 06:59 14:59


 


Intake Total 2050 4019 


 


Output Total 1800 900 


 


Balance 250 3119 











Lab Results - Last 24 hrs: 


 Laboratory Results - last 24 hr











  10/04/19 10/04/19 Range/Units





  06:11 06:11 


 


WBC  7.63   (4.0-11.0)  K/uL


 


RBC  4.11 L   (4.30-5.90)  M/uL


 


Hgb  12.6   (12.0-16.0)  g/dL


 


Hct  37.4   (36.0-46.0)  %


 


MCV  91.0   (80.0-98.0)  fL


 


MCH  30.7   (27.0-32.0)  pg


 


MCHC  33.7   (31.0-37.0)  g/dL


 


RDW Std Deviation  45.3   (28.0-62.0)  fl


 


RDW Coeff of Nyasia  14   (11.0-15.0)  %


 


Plt Count  223   (150-400)  K/uL


 


MPV  11.20   (7.40-12.00)  fL


 


Neut % (Auto)  67.2   (48.0-80.0)  %


 


Lymph % (Auto)  11.3 L   (16.0-40.0)  %


 


Mono % (Auto)  19.1 H   (0.0-15.0)  %


 


Eos % (Auto)  2.1   (0.0-7.0)  %


 


Baso % (Auto)  0.3   (0.0-1.5)  %


 


Neut # (Auto)  5.1   (1.4-5.7)  K/uL


 


Lymph # (Auto)  0.9   (0.6-2.4)  K/uL


 


Mono # (Auto)  1.5 H   (0.0-0.8)  K/uL


 


Eos # (Auto)  0.2   (0.0-0.7)  K/uL


 


Baso # (Auto)  0.0   (0.0-0.1)  K/uL


 


Nucleated RBC %  0.0   /100WBC


 


Nucleated RBCs #  0   K/uL


 


Sodium   141  (136-145)  mmol/L


 


Potassium   4.0  (3.5-5.1)  mmol/L


 


Chloride   106  ()  mmol/L


 


Carbon Dioxide   25.0  (21.0-32.0)  mmol/L


 


BUN   8  (7.0-18.0)  mg/dL


 


Creatinine   1.1 H  (0.6-1.0)  mg/dL


 


Est Cr Clr Drug Dosing   78.86  mL/min


 


Estimated GFR (MDRD)   58.7  ml/min


 


Glucose   103  ()  mg/dL


 


Calcium   8.6  (8.5-10.1)  mg/dL











MARIBELL Results - Last 24 hrs: 


 Microbiology











 10/02/19 16:00 Urine Culture - Final





 Urine, Clean Catch    MIXED CIRILO <1000 CFU/ML











Med Orders - Current: 


 Current Medications





Acetaminophen (Tylenol)  650 mg PO Q4H PRN


   PRN Reason: Pain (Mild 1-3)/fever


   Last Admin: 10/04/19 02:49 Dose:  650 mg


Ceftriaxone Sodium 1 gm/ (Sodium Chloride)  50 mls @ 100 mls/hr IV Q24H KATELYNN


   Last Admin: 10/03/19 16:04 Dose:  100 mls/hr


Ibuprofen (Motrin)  200 mg PO Q6H PRN


   PRN Reason: Pain (mild 1-3)


   Last Admin: 10/02/19 21:53 Dose:  200 mg


Ondansetron HCl (Zofran)  4 mg IVPUSH Q4H PRN


   PRN Reason: Nausea





Discontinued Medications





Sodium Chloride (Normal Saline)  1,000 mls @ 999 mls/hr IV BOLUS ONE


   Stop: 10/02/19 17:03


   Last Admin: 10/02/19 16:28 Dose:  999 mls/hr


Ceftriaxone Sodium/Dextrose 1 (gm/ Premix)  50 mls @ 100 mls/hr IV ONETIME ONE


   Stop: 10/02/19 17:25


   Last Admin: 10/02/19 17:56 Dose:  100 mls/hr


Sodium Chloride (Normal Saline)  1,000 mls @ 125 mls/hr IV ASDIRECTED [facility]


   Last Admin: 10/03/19 21:56 Dose:  125 mls/hr


Iopamidol (Isovue Multipack-370 (76%))  75 ml IVPUSH ONETIME STA


   Stop: 10/02/19 17:24


   Last Admin: 10/02/19 17:23 Dose:  75 ml


Ketorolac Tromethamine (Toradol)  30 mg IVPUSH ONETIME ONE


   Stop: 10/02/19 16:04


   Last Admin: 10/02/19 16:28 Dose:  30 mg


Nicotine (Habitrol)  14 mg TRDERM ONETIME ONE


   Stop: 10/02/19 19:00


   Last Admin: 10/02/19 19:05 Dose:  14 mg


Ondansetron HCl (Zofran)  4 mg IVPUSH ONETIME ONE


   Stop: 10/02/19 16:04


   Last Admin: 10/02/19 16:28 Dose:  4 mg